# Patient Record
Sex: MALE | Race: WHITE | NOT HISPANIC OR LATINO | Employment: FULL TIME | URBAN - METROPOLITAN AREA
[De-identification: names, ages, dates, MRNs, and addresses within clinical notes are randomized per-mention and may not be internally consistent; named-entity substitution may affect disease eponyms.]

---

## 2019-05-13 PROBLEM — M51.36 DEGENERATIVE DISC DISEASE, LUMBAR: Status: ACTIVE | Noted: 2019-05-13

## 2020-07-31 PROBLEM — S12.000A C1 CERVICAL FRACTURE: Status: ACTIVE | Noted: 2020-07-31

## 2020-07-31 PROBLEM — S12.8XXA: Status: ACTIVE | Noted: 2020-07-31

## 2020-07-31 PROBLEM — J96.01 ACUTE RESPIRATORY FAILURE WITH HYPOXIA: Status: ACTIVE | Noted: 2020-07-31

## 2020-07-31 PROBLEM — T14.8XXA CONTUSION OF SOFT TISSUE: Status: ACTIVE | Noted: 2020-07-31
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
## 2020-07-31 PROBLEM — Z99.11 ON MECHANICALLY ASSISTED VENTILATION: Status: ACTIVE | Noted: 2020-07-31

## 2020-08-02 PROBLEM — F19.90 SUBSTANCE USE DISORDER: Status: ACTIVE | Noted: 2020-08-02

## 2020-08-02 PROBLEM — J96.90 RESPIRATORY FAILURE FOLLOWING TRAUMA: Status: ACTIVE | Noted: 2020-07-31

## 2020-08-04 PROBLEM — R33.9 URINARY RETENTION: Status: ACTIVE | Noted: 2020-08-04

## 2020-08-06 PROBLEM — R33.9 URINARY RETENTION: Status: RESOLVED | Noted: 2020-08-04 | Resolved: 2020-08-06

## 2020-08-08 PROBLEM — E87.6 HYPOKALEMIA: Status: ACTIVE | Noted: 2020-08-08

## 2020-08-08 PROBLEM — D72.829 LEUCOCYTOSIS: Status: ACTIVE | Noted: 2020-08-08

## 2020-08-08 PROBLEM — J96.01 ACUTE RESPIRATORY FAILURE WITH HYPOXIA: Status: RESOLVED | Noted: 2020-07-31 | Resolved: 2020-08-08

## 2020-08-08 PROBLEM — F31.9 BIPOLAR DISORDER: Status: ACTIVE | Noted: 2020-08-08

## 2020-08-08 PROBLEM — J96.90 RESPIRATORY FAILURE FOLLOWING TRAUMA: Status: RESOLVED | Noted: 2020-07-31 | Resolved: 2020-08-08

## 2020-08-09 PROBLEM — E87.6 HYPOKALEMIA: Status: RESOLVED | Noted: 2020-08-08 | Resolved: 2020-08-09

## 2020-08-09 PROBLEM — N39.0 UTI (URINARY TRACT INFECTION): Status: ACTIVE | Noted: 2020-08-09

## 2020-08-10 PROBLEM — D72.829 LEUCOCYTOSIS: Status: RESOLVED | Noted: 2020-08-08 | Resolved: 2020-08-10

## 2020-08-15 ENCOUNTER — NURSE TRIAGE (OUTPATIENT)
Dept: ADMINISTRATIVE | Facility: CLINIC | Age: 55
End: 2020-08-15

## 2020-08-15 NOTE — TELEPHONE ENCOUNTER
Attempted to contact patient on behalf of [facility]. No answer. No follow up call required.    Reason for Disposition   Message left on identified voice mail    Additional Information   Negative: Caller is angry or rude (e.g., hangs up, verbally abusive, yelling)   Negative: Caller hangs up   Negative: Caller has already spoken with the PCP and has no further questions.   Negative: Caller has already spoken with another triager and has no further questions.   Negative: Caller has already spoken with another triager or PCP AND has further questions AND triager able to answer questions.   Negative: No answer.  First attempt to contact caller.  Follow-up call scheduled within 15 minutes.   Negative: Busy signal.  First attempt to contact caller.  Follow-up call scheduled within 15 minutes.    Protocols used: NO CONTACT OR DUPLICATE CONTACT CALL-A-

## 2023-01-17 PROBLEM — S90.02XA: Status: ACTIVE | Noted: 2023-01-17

## 2023-01-17 PROBLEM — S30.811A ABRASION OF ABDOMINAL WALL: Status: ACTIVE | Noted: 2023-01-17

## 2023-01-17 PROBLEM — S90.01XA: Status: ACTIVE | Noted: 2023-01-17

## 2023-01-17 PROBLEM — S06.9X1A CLOSED HEAD INJURY WITH BRIEF LOSS OF CONSCIOUSNESS: Status: ACTIVE | Noted: 2023-01-17
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
## 2023-01-17 PROBLEM — S40.812A ABRASION OF LEFT UPPER EXTREMITY: Status: ACTIVE | Noted: 2023-01-17

## 2023-01-17 PROBLEM — S80.811A ABRASION OF RIGHT LOWER EXTREMITY: Status: ACTIVE | Noted: 2023-01-17

## 2023-01-17 PROBLEM — S90.812A ABRASION OF LEFT FOOT: Status: ACTIVE | Noted: 2023-01-17

## 2023-01-17 PROBLEM — J84.10 PULMONARY GRANULOMA: Status: ACTIVE | Noted: 2023-01-17

## 2023-01-17 PROBLEM — S90.511A ABRASION OF RIGHT ANKLE: Status: ACTIVE | Noted: 2023-01-17

## 2023-01-17 PROBLEM — S80.01XA TRAUMATIC ECCHYMOSIS OF RIGHT KNEE: Status: ACTIVE | Noted: 2023-01-17

## 2023-01-17 PROBLEM — M47.812 SPONDYLOSIS OF CERVICAL REGION WITHOUT MYELOPATHY OR RADICULOPATHY: Status: ACTIVE | Noted: 2023-01-17

## 2023-01-17 PROBLEM — E27.8 ADRENAL NODULE: Status: ACTIVE | Noted: 2023-01-17

## 2023-01-17 PROBLEM — S80.02XA TRAUMATIC ECCHYMOSIS OF LEFT KNEE: Status: ACTIVE | Noted: 2023-01-17

## 2023-01-17 PROBLEM — G89.29 CHRONIC MIDLINE LOW BACK PAIN WITHOUT SCIATICA: Status: ACTIVE | Noted: 2023-01-17

## 2023-01-17 PROBLEM — F11.20 UNCOMPLICATED OPIOID DEPENDENCE: Status: ACTIVE | Noted: 2023-01-17

## 2023-01-17 PROBLEM — I70.0 ATHEROSCLEROSIS OF AORTA: Status: ACTIVE | Noted: 2023-01-17

## 2023-01-17 PROBLEM — N42.0 PROSTATIC CALCULI: Status: ACTIVE | Noted: 2023-01-17

## 2023-01-17 PROBLEM — K57.30 DIVERTICULOSIS OF COLON: Status: ACTIVE | Noted: 2023-01-17

## 2023-01-17 PROBLEM — S70.212A ABRASION OF LEFT HIP: Status: ACTIVE | Noted: 2023-01-17

## 2023-01-17 PROBLEM — S20.312A ABRASION OF LEFT CHEST WALL: Status: ACTIVE | Noted: 2023-01-17

## 2023-01-17 PROBLEM — S80.812A: Status: ACTIVE | Noted: 2023-01-17

## 2023-01-17 PROBLEM — N28.1 BILATERAL RENAL CYSTS: Status: ACTIVE | Noted: 2023-01-17

## 2023-01-17 PROBLEM — M47.815 SPONDYLOSIS OF THORACOLUMBAR REGION WITHOUT MYELOPATHY OR RADICULOPATHY: Status: ACTIVE | Noted: 2023-01-17

## 2023-01-17 PROBLEM — S81.811A LACERATION OF RIGHT LOWER EXTREMITY: Status: ACTIVE | Noted: 2023-01-17

## 2023-01-17 PROBLEM — K76.0 HEPATIC STEATOSIS: Status: ACTIVE | Noted: 2023-01-17

## 2023-01-17 PROBLEM — R74.01 TRANSAMINITIS: Status: ACTIVE | Noted: 2023-01-17

## 2023-01-17 PROBLEM — Z98.890 HISTORY OF LUMBAR SURGERY: Status: ACTIVE | Noted: 2023-01-17

## 2023-01-17 PROBLEM — M54.50 CHRONIC MIDLINE LOW BACK PAIN WITHOUT SCIATICA: Status: ACTIVE | Noted: 2023-01-17

## 2023-01-17 PROBLEM — S03.00XD TMJ (DISLOCATION OF TEMPOROMANDIBULAR JOINT), SUBSEQUENT ENCOUNTER: Status: ACTIVE | Noted: 2023-01-17

## 2023-01-17 PROBLEM — K75.3 HEPATIC GRANULOMA: Status: ACTIVE | Noted: 2023-01-17

## 2023-01-17 PROBLEM — S90.512A ABRASION OF LEFT ANKLE: Status: ACTIVE | Noted: 2023-01-17

## 2023-01-17 PROBLEM — D72.829 LEUKOCYTOSIS: Status: ACTIVE | Noted: 2023-01-17

## 2023-01-17 PROBLEM — E66.9 OBESITY, CLASS I, BMI 30-34.9: Status: ACTIVE | Noted: 2023-01-17

## 2023-01-18 PROBLEM — Z72.0 TOBACCO ABUSE: Status: ACTIVE | Noted: 2023-01-18

## 2023-01-18 PROBLEM — F32.9 MDD (MAJOR DEPRESSIVE DISORDER): Status: ACTIVE | Noted: 2023-01-18

## 2023-01-18 PROBLEM — Z98.890 HISTORY OF INGUINAL HERNIA REPAIR: Status: ACTIVE | Noted: 2023-01-18

## 2023-01-18 PROBLEM — I10 PRIMARY HYPERTENSION: Status: ACTIVE | Noted: 2023-01-18

## 2023-01-18 PROBLEM — Z87.19 HISTORY OF INGUINAL HERNIA REPAIR: Status: ACTIVE | Noted: 2023-01-18

## 2023-01-18 PROBLEM — F41.1 GAD (GENERALIZED ANXIETY DISORDER): Status: ACTIVE | Noted: 2023-01-18

## 2023-01-18 PROBLEM — Z86.59 HISTORY OF ADMISSION TO INPATIENT PSYCHIATRY DEPARTMENT: Status: ACTIVE | Noted: 2023-01-18

## 2023-01-18 PROBLEM — F15.11 HISTORY OF METHAMPHETAMINE ABUSE: Status: ACTIVE | Noted: 2023-01-18

## 2023-01-18 PROBLEM — Z91.51 HISTORY OF SUICIDE ATTEMPT: Status: ACTIVE | Noted: 2023-01-18

## 2023-08-11 PROBLEM — N13.8 BPH WITH OBSTRUCTION/LOWER URINARY TRACT SYMPTOMS: Status: ACTIVE | Noted: 2023-08-11

## 2023-08-11 PROBLEM — N52.9 ERECTILE DYSFUNCTION: Status: ACTIVE | Noted: 2023-08-11

## 2023-08-11 PROBLEM — N40.1 BPH WITH OBSTRUCTION/LOWER URINARY TRACT SYMPTOMS: Status: ACTIVE | Noted: 2023-08-11

## 2023-08-11 PROBLEM — E29.1 MALE HYPOGONADISM: Status: ACTIVE | Noted: 2023-08-11
